# Patient Record
Sex: MALE | Race: WHITE | URBAN - METROPOLITAN AREA
[De-identification: names, ages, dates, MRNs, and addresses within clinical notes are randomized per-mention and may not be internally consistent; named-entity substitution may affect disease eponyms.]

---

## 2021-10-19 ENCOUNTER — OFFICE VISIT (OUTPATIENT)
Dept: URBAN - METROPOLITAN AREA CLINIC 56 | Facility: CLINIC | Age: 76
End: 2021-10-19
Payer: MEDICARE

## 2021-10-19 DIAGNOSIS — H35.3131 NONEXUDATIVE MACULAR DEGENERATION, EARLY DRY STAGE, BILATERAL: ICD-10-CM

## 2021-10-19 DIAGNOSIS — H02.831 DERMATOCHALASIS OF RIGHT UPPER EYELID: ICD-10-CM

## 2021-10-19 DIAGNOSIS — H25.13 AGE-RELATED NUCLEAR CATARACT, BILATERAL: Primary | ICD-10-CM

## 2021-10-19 DIAGNOSIS — H02.834 DERMATOCHALASIS OF LEFT UPPER EYELID: ICD-10-CM

## 2021-10-19 DIAGNOSIS — H40.013 OPEN ANGLE WITH BORDERLINE FINDINGS, LOW RISK, BILATERAL: ICD-10-CM

## 2021-10-19 PROCEDURE — 92134 CPTRZ OPH DX IMG PST SGM RTA: CPT | Performed by: STUDENT IN AN ORGANIZED HEALTH CARE EDUCATION/TRAINING PROGRAM

## 2021-10-19 PROCEDURE — 92004 COMPRE OPH EXAM NEW PT 1/>: CPT | Performed by: STUDENT IN AN ORGANIZED HEALTH CARE EDUCATION/TRAINING PROGRAM

## 2021-10-19 PROCEDURE — 92133 CPTRZD OPH DX IMG PST SGM ON: CPT | Performed by: STUDENT IN AN ORGANIZED HEALTH CARE EDUCATION/TRAINING PROGRAM

## 2021-10-19 ASSESSMENT — KERATOMETRY
OD: 43.41
OS: 43.23

## 2021-10-19 ASSESSMENT — INTRAOCULAR PRESSURE
OD: 16
OS: 16

## 2021-10-19 ASSESSMENT — VISUAL ACUITY
OD: 20/20
OS: 20/30

## 2021-10-19 NOTE — IMPRESSION/PLAN
Impression: Open angle with borderline findings, low risk, bilateral: H40.013. Plan: Patient is being followed by outside provider, continue care.

## 2021-10-19 NOTE — IMPRESSION/PLAN
Impression: Nonexudative macular degeneration, early dry stage, bilateral: H35.3131. Plan: Patient educated on condition, Mac OCT is stable at today's visit with no breaks. Educated patient on importance of monitoring with [employer] grid at home and to continue taking [employer]'s vitamin, wearing UV protection, and adding leafy greens to diet. Monitor.

## 2021-10-19 NOTE — IMPRESSION/PLAN
Impression: Dermatochalasis of left upper eyelid: H02.834. Plan: Educated patient on condition, patient is asymptomatic, monitor. Consult oculoplastics PRN.

## 2021-10-19 NOTE — IMPRESSION/PLAN
Impression: Age-related nuclear cataract, bilateral: H25.13.
-Cause of reduced BCVA OS Plan: Patient educated on condition, at this time cataracts are not visually significant for patient. Discussed cataract surgery options in the future, continue to monitor at this time.

## 2021-10-19 NOTE — IMPRESSION/PLAN
Impression: Dermatochalasis of right upper eyelid: H02.831. Plan: Educated patient on condition, patient is asymptomatic, monitor. Consult oculoplastics PRN.

## 2022-11-14 ENCOUNTER — OFFICE VISIT (OUTPATIENT)
Dept: URBAN - METROPOLITAN AREA CLINIC 56 | Facility: LOCATION | Age: 77
End: 2022-11-14
Payer: MEDICARE

## 2022-11-14 DIAGNOSIS — H40.013 OPEN ANGLE WITH BORDERLINE FINDINGS, LOW RISK, BILATERAL: ICD-10-CM

## 2022-11-14 DIAGNOSIS — H25.813 COMBINED FORMS OF AGE-RELATED CATARACT, BILATERAL: Primary | ICD-10-CM

## 2022-11-14 PROCEDURE — 99214 OFFICE O/P EST MOD 30 MIN: CPT

## 2022-11-14 ASSESSMENT — VISUAL ACUITY
OD: 20/20
OS: 20/25

## 2022-11-14 ASSESSMENT — KERATOMETRY
OS: 43.38
OD: 43.34

## 2022-11-14 ASSESSMENT — INTRAOCULAR PRESSURE
OD: 16
OS: 16

## 2022-11-14 NOTE — IMPRESSION/PLAN
Impression: Combined forms of age-related cataract, bilateral: H25.813. Plan: Patient advised there is a cataract, but that visual function is good, and cataract surgery is not required at this time. Further advised there is no specific urgency for cataract surgery. They were also advised that having cataract surgery does not mean they will not need to use glasses or contact lenses. We will continue to monitor and they are advised to call or return if any new symptoms.

## 2024-02-07 ENCOUNTER — OFFICE VISIT (OUTPATIENT)
Dept: URBAN - METROPOLITAN AREA CLINIC 56 | Facility: LOCATION | Age: 79
End: 2024-02-07
Payer: MEDICARE

## 2024-02-07 DIAGNOSIS — H25.813 COMBINED FORMS OF AGE-RELATED CATARACT, BILATERAL: Primary | ICD-10-CM

## 2024-02-07 DIAGNOSIS — H35.3131 NONEXUDATIVE AGE-RELATED MACULAR DEGENERATION, BILATERAL, EARLY DRY STAGE: ICD-10-CM

## 2024-02-07 DIAGNOSIS — H43.813 VITREOUS DEGENERATION, BILATERAL: ICD-10-CM

## 2024-02-07 DIAGNOSIS — H52.4 PRESBYOPIA: ICD-10-CM

## 2024-02-07 DIAGNOSIS — H40.013 OPEN ANGLE WITH BORDERLINE FINDINGS, LOW RISK, BILATERAL: ICD-10-CM

## 2024-02-07 PROCEDURE — 92014 COMPRE OPH EXAM EST PT 1/>: CPT | Performed by: STUDENT IN AN ORGANIZED HEALTH CARE EDUCATION/TRAINING PROGRAM

## 2024-02-07 PROCEDURE — 92133 CPTRZD OPH DX IMG PST SGM ON: CPT | Performed by: STUDENT IN AN ORGANIZED HEALTH CARE EDUCATION/TRAINING PROGRAM

## 2024-02-07 ASSESSMENT — KERATOMETRY
OS: 43.49
OD: 43.44

## 2024-02-07 ASSESSMENT — INTRAOCULAR PRESSURE
OS: 14
OD: 14

## 2024-02-07 ASSESSMENT — VISUAL ACUITY
OD: 20/20
OS: 20/25